# Patient Record
Sex: MALE | Race: WHITE | Employment: FULL TIME | URBAN - METROPOLITAN AREA
[De-identification: names, ages, dates, MRNs, and addresses within clinical notes are randomized per-mention and may not be internally consistent; named-entity substitution may affect disease eponyms.]

---

## 2019-11-21 RX ORDER — IBUPROFEN 200 MG
400 TABLET ORAL
COMMUNITY

## 2019-11-21 NOTE — PERIOP NOTES
PAT PREOP PHONE INTERVIEW COMPLETED WITH:     PATIENT ADVISED NOT TO EAT/DRINK ANYTHING PAST MIDNIGHT EVENING PRIOR TO SURGERY,  NOTHING TO EAT/DRINK MORNING OF SURGERY UNLESS SIP OF WATER TO SWALLOW MEDICATION;  LEAVE ALL VALUABLES AT HOME; DO BRING PICTURE ID, INSURANCE CARD AND ANY COPAY; WEAR COMFORTABLE CLOTHING;  NO PERFUMES, POWDERS, LOTIONS; NO ALCOHOL 24 HOURS BEFORE OR AFTER SURGERY;  WILL NEED TO BE DRIVEN HOME BY FAMILY OR FRIEND;  AVOID TAKING NSAIDS, ASPIRIN, FISH OIL, VITAMIN E OR GLUCOSAMINE/CHONDROITIN DURING THIS TIME PRIOR TO SURGERY;  MAY TAKE TYLENOL. INSTRUCTED TO REPORT  [address] BY SURGEON'S OFFICE.

## 2019-11-23 ENCOUNTER — ANESTHESIA EVENT (OUTPATIENT)
Dept: SURGERY | Age: 49
End: 2019-11-23
Payer: COMMERCIAL

## 2019-11-25 ENCOUNTER — HOSPITAL ENCOUNTER (OUTPATIENT)
Age: 49
Setting detail: OUTPATIENT SURGERY
Discharge: HOME | End: 2019-11-25
Attending: DENTIST | Admitting: DENTIST
Payer: COMMERCIAL

## 2019-11-25 ENCOUNTER — ANESTHESIA (OUTPATIENT)
Dept: SURGERY | Age: 49
End: 2019-11-25
Payer: COMMERCIAL

## 2019-11-25 VITALS
TEMPERATURE: 98 F | HEIGHT: 73 IN | WEIGHT: 195 LBS | HEART RATE: 87 BPM | SYSTOLIC BLOOD PRESSURE: 132 MMHG | OXYGEN SATURATION: 93 % | RESPIRATION RATE: 20 BRPM | DIASTOLIC BLOOD PRESSURE: 88 MMHG | BODY MASS INDEX: 25.84 KG/M2

## 2019-11-25 DIAGNOSIS — M27.40 MAXILLARY CYST: Primary | ICD-10-CM

## 2019-11-25 PROCEDURE — 88305 TISSUE EXAM BY PATHOLOGIST: CPT

## 2019-11-25 PROCEDURE — 74011000250 HC RX REV CODE- 250: Performed by: NURSE ANESTHETIST, CERTIFIED REGISTERED

## 2019-11-25 PROCEDURE — 74011250637 HC RX REV CODE- 250/637: Performed by: NURSE ANESTHETIST, CERTIFIED REGISTERED

## 2019-11-25 PROCEDURE — 74011000258 HC RX REV CODE- 258: Performed by: NURSE ANESTHETIST, CERTIFIED REGISTERED

## 2019-11-25 PROCEDURE — 74011250636 HC RX REV CODE- 250/636: Performed by: ANESTHESIOLOGY

## 2019-11-25 PROCEDURE — 77030008683 HC TU ET CUF COVD -A: Performed by: ANESTHESIOLOGY

## 2019-11-25 PROCEDURE — 74011250637 HC RX REV CODE- 250/637: Performed by: ANESTHESIOLOGY

## 2019-11-25 PROCEDURE — 76210000020 HC REC RM PH II FIRST 0.5 HR: Performed by: DENTIST

## 2019-11-25 PROCEDURE — 77030004386 HC BUR FISS STRY -B: Performed by: DENTIST

## 2019-11-25 PROCEDURE — 77030018836 HC SOL IRR NACL ICUM -A: Performed by: DENTIST

## 2019-11-25 PROCEDURE — 76060000035 HC ANESTHESIA 2 TO 2.5 HR: Performed by: DENTIST

## 2019-11-25 PROCEDURE — 76210000006 HC OR PH I REC 0.5 TO 1 HR: Performed by: DENTIST

## 2019-11-25 PROCEDURE — 74011250636 HC RX REV CODE- 250/636: Performed by: NURSE ANESTHETIST, CERTIFIED REGISTERED

## 2019-11-25 PROCEDURE — 77030008684 HC TU ET CUF COVD -B: Performed by: ANESTHESIOLOGY

## 2019-11-25 PROCEDURE — 77030018846 HC SOL IRR STRL H20 ICUM -A: Performed by: DENTIST

## 2019-11-25 PROCEDURE — 76010000131 HC OR TIME 2 TO 2.5 HR: Performed by: DENTIST

## 2019-11-25 PROCEDURE — 74011000250 HC RX REV CODE- 250: Performed by: DENTIST

## 2019-11-25 PROCEDURE — 77030040922 HC BLNKT HYPOTHRM STRY -A

## 2019-11-25 PROCEDURE — 77030011283 HC ELECTRD NDL COVD -A: Performed by: DENTIST

## 2019-11-25 PROCEDURE — 77030002888 HC SUT CHRMC J&J -A: Performed by: DENTIST

## 2019-11-25 RX ORDER — HYDROCODONE BITARTRATE AND ACETAMINOPHEN 5; 325 MG/1; MG/1
1 TABLET ORAL AS NEEDED
Status: DISCONTINUED | OUTPATIENT
Start: 2019-11-25 | End: 2019-11-25 | Stop reason: HOSPADM

## 2019-11-25 RX ORDER — MORPHINE SULFATE 10 MG/ML
2 INJECTION, SOLUTION INTRAMUSCULAR; INTRAVENOUS
Status: DISCONTINUED | OUTPATIENT
Start: 2019-11-25 | End: 2019-11-25 | Stop reason: HOSPADM

## 2019-11-25 RX ORDER — GLYCOPYRROLATE 0.2 MG/ML
0.2 INJECTION INTRAMUSCULAR; INTRAVENOUS
Status: DISCONTINUED | OUTPATIENT
Start: 2019-11-25 | End: 2019-11-25 | Stop reason: HOSPADM

## 2019-11-25 RX ORDER — FENTANYL CITRATE 50 UG/ML
INJECTION, SOLUTION INTRAMUSCULAR; INTRAVENOUS AS NEEDED
Status: DISCONTINUED | OUTPATIENT
Start: 2019-11-25 | End: 2019-11-25 | Stop reason: HOSPADM

## 2019-11-25 RX ORDER — HYDROMORPHONE HYDROCHLORIDE 1 MG/ML
0.2 INJECTION, SOLUTION INTRAMUSCULAR; INTRAVENOUS; SUBCUTANEOUS
Status: DISCONTINUED | OUTPATIENT
Start: 2019-11-25 | End: 2019-11-25 | Stop reason: HOSPADM

## 2019-11-25 RX ORDER — ROCURONIUM BROMIDE 10 MG/ML
INJECTION, SOLUTION INTRAVENOUS AS NEEDED
Status: DISCONTINUED | OUTPATIENT
Start: 2019-11-25 | End: 2019-11-25 | Stop reason: HOSPADM

## 2019-11-25 RX ORDER — ROPIVACAINE HYDROCHLORIDE 5 MG/ML
30 INJECTION, SOLUTION EPIDURAL; INFILTRATION; PERINEURAL AS NEEDED
Status: DISCONTINUED | OUTPATIENT
Start: 2019-11-25 | End: 2019-11-25 | Stop reason: HOSPADM

## 2019-11-25 RX ORDER — MIDAZOLAM HYDROCHLORIDE 1 MG/ML
0.5 INJECTION, SOLUTION INTRAMUSCULAR; INTRAVENOUS
Status: DISCONTINUED | OUTPATIENT
Start: 2019-11-25 | End: 2019-11-25 | Stop reason: HOSPADM

## 2019-11-25 RX ORDER — DIPHENHYDRAMINE HYDROCHLORIDE 50 MG/ML
12.5 INJECTION, SOLUTION INTRAMUSCULAR; INTRAVENOUS AS NEEDED
Status: DISCONTINUED | OUTPATIENT
Start: 2019-11-25 | End: 2019-11-25 | Stop reason: HOSPADM

## 2019-11-25 RX ORDER — GLYCOPYRROLATE 0.2 MG/ML
INJECTION INTRAMUSCULAR; INTRAVENOUS AS NEEDED
Status: DISCONTINUED | OUTPATIENT
Start: 2019-11-25 | End: 2019-11-25 | Stop reason: HOSPADM

## 2019-11-25 RX ORDER — SODIUM CHLORIDE, SODIUM LACTATE, POTASSIUM CHLORIDE, CALCIUM CHLORIDE 600; 310; 30; 20 MG/100ML; MG/100ML; MG/100ML; MG/100ML
1000 INJECTION, SOLUTION INTRAVENOUS CONTINUOUS
Status: DISCONTINUED | OUTPATIENT
Start: 2019-11-25 | End: 2019-11-25 | Stop reason: HOSPADM

## 2019-11-25 RX ORDER — SODIUM CHLORIDE, SODIUM LACTATE, POTASSIUM CHLORIDE, CALCIUM CHLORIDE 600; 310; 30; 20 MG/100ML; MG/100ML; MG/100ML; MG/100ML
INJECTION, SOLUTION INTRAVENOUS
Status: DISCONTINUED | OUTPATIENT
Start: 2019-11-25 | End: 2019-11-25 | Stop reason: HOSPADM

## 2019-11-25 RX ORDER — PROPOFOL 10 MG/ML
INJECTION, EMULSION INTRAVENOUS AS NEEDED
Status: DISCONTINUED | OUTPATIENT
Start: 2019-11-25 | End: 2019-11-25 | Stop reason: HOSPADM

## 2019-11-25 RX ORDER — MIDAZOLAM HYDROCHLORIDE 1 MG/ML
INJECTION, SOLUTION INTRAMUSCULAR; INTRAVENOUS AS NEEDED
Status: DISCONTINUED | OUTPATIENT
Start: 2019-11-25 | End: 2019-11-25 | Stop reason: HOSPADM

## 2019-11-25 RX ORDER — DEXAMETHASONE SODIUM PHOSPHATE 4 MG/ML
INJECTION, SOLUTION INTRA-ARTICULAR; INTRALESIONAL; INTRAMUSCULAR; INTRAVENOUS; SOFT TISSUE AS NEEDED
Status: DISCONTINUED | OUTPATIENT
Start: 2019-11-25 | End: 2019-11-25 | Stop reason: HOSPADM

## 2019-11-25 RX ORDER — MIDAZOLAM HYDROCHLORIDE 1 MG/ML
1 INJECTION, SOLUTION INTRAMUSCULAR; INTRAVENOUS AS NEEDED
Status: DISCONTINUED | OUTPATIENT
Start: 2019-11-25 | End: 2019-11-25 | Stop reason: HOSPADM

## 2019-11-25 RX ORDER — SUCCINYLCHOLINE CHLORIDE 20 MG/ML
INJECTION INTRAMUSCULAR; INTRAVENOUS AS NEEDED
Status: DISCONTINUED | OUTPATIENT
Start: 2019-11-25 | End: 2019-11-25 | Stop reason: HOSPADM

## 2019-11-25 RX ORDER — LIDOCAINE HYDROCHLORIDE AND EPINEPHRINE 20; 10 MG/ML; UG/ML
INJECTION, SOLUTION INFILTRATION; PERINEURAL AS NEEDED
Status: DISCONTINUED | OUTPATIENT
Start: 2019-11-25 | End: 2019-11-25 | Stop reason: HOSPADM

## 2019-11-25 RX ORDER — ONDANSETRON 2 MG/ML
4 INJECTION INTRAMUSCULAR; INTRAVENOUS AS NEEDED
Status: DISCONTINUED | OUTPATIENT
Start: 2019-11-25 | End: 2019-11-25 | Stop reason: HOSPADM

## 2019-11-25 RX ORDER — CEFAZOLIN SODIUM 1 G/3ML
INJECTION, POWDER, FOR SOLUTION INTRAMUSCULAR; INTRAVENOUS AS NEEDED
Status: DISCONTINUED | OUTPATIENT
Start: 2019-11-25 | End: 2019-11-25 | Stop reason: HOSPADM

## 2019-11-25 RX ORDER — LIDOCAINE HYDROCHLORIDE AND EPINEPHRINE 10; 10 MG/ML; UG/ML
INJECTION, SOLUTION INFILTRATION; PERINEURAL AS NEEDED
Status: DISCONTINUED | OUTPATIENT
Start: 2019-11-25 | End: 2019-11-25 | Stop reason: HOSPADM

## 2019-11-25 RX ORDER — FENTANYL CITRATE 50 UG/ML
25 INJECTION, SOLUTION INTRAMUSCULAR; INTRAVENOUS
Status: DISCONTINUED | OUTPATIENT
Start: 2019-11-25 | End: 2019-11-25 | Stop reason: HOSPADM

## 2019-11-25 RX ORDER — OXYMETAZOLINE HCL 0.05 %
SPRAY, NON-AEROSOL (ML) NASAL AS NEEDED
Status: DISCONTINUED | OUTPATIENT
Start: 2019-11-25 | End: 2019-11-25 | Stop reason: HOSPADM

## 2019-11-25 RX ORDER — SODIUM CHLORIDE 9 MG/ML
25 INJECTION, SOLUTION INTRAVENOUS CONTINUOUS
Status: DISCONTINUED | OUTPATIENT
Start: 2019-11-25 | End: 2019-11-25 | Stop reason: HOSPADM

## 2019-11-25 RX ORDER — LIDOCAINE HYDROCHLORIDE 10 MG/ML
0.1 INJECTION, SOLUTION EPIDURAL; INFILTRATION; INTRACAUDAL; PERINEURAL AS NEEDED
Status: DISCONTINUED | OUTPATIENT
Start: 2019-11-25 | End: 2019-11-25 | Stop reason: HOSPADM

## 2019-11-25 RX ORDER — SCOLOPAMINE TRANSDERMAL SYSTEM 1 MG/1
PATCH, EXTENDED RELEASE TRANSDERMAL AS NEEDED
Status: DISCONTINUED | OUTPATIENT
Start: 2019-11-25 | End: 2019-11-25 | Stop reason: HOSPADM

## 2019-11-25 RX ORDER — ONDANSETRON 2 MG/ML
INJECTION INTRAMUSCULAR; INTRAVENOUS AS NEEDED
Status: DISCONTINUED | OUTPATIENT
Start: 2019-11-25 | End: 2019-11-25 | Stop reason: HOSPADM

## 2019-11-25 RX ORDER — HYDROCODONE BITARTRATE AND ACETAMINOPHEN 5; 325 MG/1; MG/1
1 TABLET ORAL
Qty: 15 TAB | Refills: 0 | Status: SHIPPED | OUTPATIENT
Start: 2019-11-25 | End: 2019-11-30

## 2019-11-25 RX ORDER — FENTANYL CITRATE 50 UG/ML
50 INJECTION, SOLUTION INTRAMUSCULAR; INTRAVENOUS AS NEEDED
Status: DISCONTINUED | OUTPATIENT
Start: 2019-11-25 | End: 2019-11-25 | Stop reason: HOSPADM

## 2019-11-25 RX ORDER — ACETAMINOPHEN 325 MG/1
650 TABLET ORAL ONCE
Status: COMPLETED | OUTPATIENT
Start: 2019-11-25 | End: 2019-11-25

## 2019-11-25 RX ORDER — ALBUTEROL SULFATE 0.83 MG/ML
2.5 SOLUTION RESPIRATORY (INHALATION) AS NEEDED
Status: DISCONTINUED | OUTPATIENT
Start: 2019-11-25 | End: 2019-11-25 | Stop reason: HOSPADM

## 2019-11-25 RX ADMIN — OXYMETAZOLINE HYDROCHLORIDE 3 SPRAY: 5 SPRAY NASAL at 07:40

## 2019-11-25 RX ADMIN — DEXAMETHASONE SODIUM PHOSPHATE 10 MG: 4 INJECTION, SOLUTION INTRAMUSCULAR; INTRAVENOUS at 07:58

## 2019-11-25 RX ADMIN — ROCURONIUM BROMIDE 5 MG: 10 SOLUTION INTRAVENOUS at 07:44

## 2019-11-25 RX ADMIN — SUCCINYLCHOLINE CHLORIDE 140 MG: 20 INJECTION, SOLUTION INTRAMUSCULAR; INTRAVENOUS at 07:44

## 2019-11-25 RX ADMIN — OXYMETAZOLINE HYDROCHLORIDE 3 SPRAY: 5 SPRAY NASAL at 07:34

## 2019-11-25 RX ADMIN — ACETAMINOPHEN 650 MG: 325 TABLET ORAL at 06:45

## 2019-11-25 RX ADMIN — PROPOFOL 50 MG: 10 INJECTION, EMULSION INTRAVENOUS at 09:04

## 2019-11-25 RX ADMIN — FENTANYL CITRATE 100 MCG: 50 INJECTION, SOLUTION INTRAMUSCULAR; INTRAVENOUS at 07:44

## 2019-11-25 RX ADMIN — SODIUM CHLORIDE, SODIUM LACTATE, POTASSIUM CHLORIDE, AND CALCIUM CHLORIDE 1000 ML: 600; 310; 30; 20 INJECTION, SOLUTION INTRAVENOUS at 06:38

## 2019-11-25 RX ADMIN — MIDAZOLAM 1 MG: 1 INJECTION INTRAMUSCULAR; INTRAVENOUS at 07:40

## 2019-11-25 RX ADMIN — PHENYLEPHRINE HYDROCHLORIDE 30 MCG/MIN: 10 INJECTION INTRAVENOUS at 08:11

## 2019-11-25 RX ADMIN — SODIUM CHLORIDE, POTASSIUM CHLORIDE, SODIUM LACTATE AND CALCIUM CHLORIDE: 600; 310; 30; 20 INJECTION, SOLUTION INTRAVENOUS at 07:34

## 2019-11-25 RX ADMIN — DEXMEDETOMIDINE HYDROCHLORIDE 4 MCG: 100 INJECTION, SOLUTION, CONCENTRATE INTRAVENOUS at 07:40

## 2019-11-25 RX ADMIN — GLYCOPYRROLATE 0.4 MG: 0.2 INJECTION, SOLUTION INTRAMUSCULAR; INTRAVENOUS at 09:04

## 2019-11-25 RX ADMIN — SUCCINYLCHOLINE CHLORIDE 60 MG: 20 INJECTION, SOLUTION INTRAMUSCULAR; INTRAVENOUS at 07:48

## 2019-11-25 RX ADMIN — PROPOFOL 150 MG: 10 INJECTION, EMULSION INTRAVENOUS at 07:44

## 2019-11-25 RX ADMIN — DEXMEDETOMIDINE HYDROCHLORIDE 8 MCG: 100 INJECTION, SOLUTION, CONCENTRATE INTRAVENOUS at 07:34

## 2019-11-25 RX ADMIN — ONDANSETRON HYDROCHLORIDE 4 MG: 2 INJECTION, SOLUTION INTRAMUSCULAR; INTRAVENOUS at 09:21

## 2019-11-25 RX ADMIN — FENTANYL CITRATE 25 MCG: 50 INJECTION, SOLUTION INTRAMUSCULAR; INTRAVENOUS at 09:34

## 2019-11-25 RX ADMIN — SCOPALAMINE 1 PATCH: 1 PATCH, EXTENDED RELEASE TRANSDERMAL at 07:34

## 2019-11-25 RX ADMIN — ONDANSETRON HYDROCHLORIDE 4 MG: 2 INJECTION, SOLUTION INTRAMUSCULAR; INTRAVENOUS at 07:58

## 2019-11-25 RX ADMIN — CEFAZOLIN 2 G: 330 INJECTION, POWDER, FOR SOLUTION INTRAMUSCULAR; INTRAVENOUS at 07:58

## 2019-11-25 RX ADMIN — MIDAZOLAM 2 MG: 1 INJECTION INTRAMUSCULAR; INTRAVENOUS at 07:34

## 2019-11-25 NOTE — BRIEF OP NOTE
BRIEF OPERATIVE NOTE    Date of Procedure: 11/25/2019   Preoperative Diagnosis: BENIGN CYST/TUMOR OF BONES OF FACE(MAXILLA), CHRONIC MAXILALRY SINUSITIS, CHRONIC PERIAPICAL ABSCESS WITHOUT SINUS  Postoperative Diagnosis: BENIGN CYST/TUMOR OF BONES OF FACE, CHRONIC MAXILALRY SINUSITIS, CHRONIC PERIAPICAL ABSCESS WITHOUT SINUS    Procedure(s):  EXCISION CYST OF MAXILLA, APICOECTOMIES BUCCAL ROOTS  (x4) #14, #15  AND RETROGRADE FILLINGS X4  Surgeon(s) and Role:     Anya Angel DDS - Primary         Surgical Assistant: None    Surgical Staff:  Circ-1: Rigo Domingo RN  Circ-Relief: Imtiaz Jose  Scrub Tech-1: [facility]  Surg Asst-1: Ashlie Gaitan  Event Time In Time Out   Incision Start 5169    Incision Close 0927      Anesthesia: General   Estimated Blood Loss: 10cc  Specimens:   ID Type Source Tests Collected by Time Destination   1 : CYST LEFT MAXILLA Fresh Cyst  Ad Flores DDS 11/25/2019 0821 Pathology      Findings: No extension of cyst into max.  sinus airspace   Complications: None  Implants: * No implants in log *

## 2019-11-25 NOTE — OP NOTES
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 PROCEDURES PERFORMED:  Excision of cyst, left maxilla, apicoectomies buccal roots (x4), teeth numbers 14 and 15, and retrograde fillings x4. SURGEON:  Rolo Wang DDS    ASSISTANT:  None. ANESTHESIA:  General.    COMPLICATIONS:  None. SPECIMENS REMOVED:  Specimens x1, cyst, left maxilla. IMPLANTS:  None. ESTIMATED BLOOD LOSS:  10 mL. INDICATIONS FOR SURGERY:  The patient is a 29-year-old white male, who has had persistent pain and some swelling associated with the left maxilla and infraorbital area. He had undergone root canal therapy of teeth numbers 14 and 15 in the past and was found to have a cystic-appearing lesion from the anterior of the #14 root posteriorly to the posterior portion of the maxilla and superiorly to the sinus floor. There was also a radiopaque character and substance present in this area as well of unknown etiology, but most likely secondary to bone graft done previously many years ago at the time of his wisdom tooth surgery. He presents for removal of the lesion and apicoectomies as outlined above. PROCEDURE IN DETAIL:  The patient was taken to the operating room and placed in the supine position. After induction of anesthesia and oral intubation, he was prepped and draped in routine fashion for intraoral surgery. A throat pack was placed and a total of 9 mL of 2% Xylocaine with 1:100,000 epinephrine was given to anesthetize the left posterior maxilla.   An incision was made across the left maxillary tuberosity and forward  in the gingival sulcus to the mesial of tooth #14 and then vertically angled anteriorly into the vestibule. The full-thickness subperiosteal flap was developed and reflected superiorly and it could easily immediately be seen that the radio-opaque substance was indeed what appeared to be an old grasp perhaps of hydroxyapatite. Access was gained through the bone of a lateral maxilla just above root apices of teeth #14 and #15 until the cyst could be identified, and then the bony ostectomy was enlarged in order gain access to this area. Once this was accomplished and a portion of the previous replaced bone graft was removed in order to gain access, the cyst was curetted out and one large specimen was sent to pathology. The four lateral roots were then cut back so that the root canal fillings could be seen at each one and additional remnants of any cystic material was removed. Each of the four buccal roots of teeth numbers 14 and 15 underwent ultrasonic endodontic preparation of the apex and these four areas were retro-filled with Super and then EBA cement. The excess cement was then removed once the cement had set, and a thorough irrigation of the cystic cavity was accomplished. The bone on the periphery was then rounded off and smoothed with the rotary geetha hand instruments and the area was once again thoroughly irrigated and then closed with 3-0 chromic and 4-0 chromic sutures. The palatal roots were not involved in the lesion. There was also no overt opening into the sinus air space. The throat pack was removed and the patient was then taken to recovery room in stable condition.         Jana Hinkle DDS MM/[email or web address]_I/[email or web address]_P  D:  11/25/2019 10:08  T:  11/25/2019 14:17  JOB #:  [id number]

## 2019-11-25 NOTE — PERIOP NOTES
Patient: Bree Goldstein MRN: [id number]  SSN: [id number]   YOB: 1970  Age: 54 y.o. Sex: male     Patient is status post Procedure(s):  EXCISION CYST OF MAXILLA, APICOECTOMIES BUCCAL ROOTS  (x4) #14, #15  AND RETROGRADE FILLINGS X4.    Surgeon(s) and Role:     Huma Meza DDS - Primary    Local/Dose/Irrigation:  9mL 2% marcaine with 1:[id number] epi local injection to left mouth, 1L normal saline irrigation prn surgical site                   Peripheral IV 11/25/19 Right Hand (Active)                           Dressing/Packing:  Wound Mouth Left-Dressing Type: 4 x 4;Dry dressing; Sutures(oral) (11/25/19 3778)    Splint/Cast:  ]    Other:  scd

## 2019-11-25 NOTE — DISCHARGE INSTRUCTIONS
PATIENT DISCHARGE INSTRUCTIONS      PATIENT DISCHARGE INSTRUCTIONS    Godfrey Zuleta / [id number] : 1970    Admitted 2019 Discharged: 2019       · It is important that you take the medication exactly as they are prescribed. · Keep your medication in the bottles provided by the pharmacist and keep a list of the medication names, dosages, and times to be taken in your wallet. · Do not take other medications without consulting your doctor. What to do at Home    Recommended Diet: Regular Diet - soft and chew on right    Recommended Activity: Activity as tolerated - mild    Ice to left face - 20 min on/off - for 36 hours or so after surgery. Keep head elevated while lying down for at least 3 days after surgery    If you experience any of the following symptoms - unusual bleeding, nausea or pain, please follow up with Dr. Hari Gaspar at [phone]. Patient Education   Scopolamine (Absorbed through the skin)   Scopolamine ([id number])  Treat nausea and vomiting. Brand Name(s): Transderm Scop   There may be other brand names for this medicine. When This Medicine Should Not Be Used: You should not use this medicine if you have had an allergic reaction to scopolamine, or if you have narrow angle glaucoma. How to Use This Medicine:   Patch  · Your doctor will tell you how many patches to use, where to apply them, and how often to apply them. Do not use more patches or apply them more often than your doctor tells you to. · Read and follow the patient instructions that come with this medicine. Talk to your doctor or pharmacist if you have any questions. · To prevent motion sickness, apply the patch at least 4 hours before you need it. · Wash and dry your hands thoroughly before applying the patch. · Leave the patch in its sealed wrapper until you are ready to put it on. Tear the wrapper open carefully. NEVER CUT the wrapper or the patch with scissors.  Do not use any patch that has been cut by accident. · Take the liner off the sticky side before applying. · Apply the patch to dry, hairless skin behind the ear. · If the patch is loose or falls off, apply a new patch at a different place behind the ear. · After you take off the patch, wash the place where the patch was and your hands thoroughly. · Only one patch should be used at any time. If a dose is missed:   · If you forget to wear or change a patch, put one on as soon as you can. If it is almost time to put on your next patch, wait until then to apply a new patch and skip the one you missed. Do not apply extra patches to make up for a missed dose. How to Store and Dispose of This Medicine:   · Store the patches at room temperature in a closed container, away from heat, moisture, and direct light. · Fold the used patch in half with the sticky sides together. Throw any used patch away so that children or pets cannot get to it. You will also need to throw away old patches after the expiration date has passed. · Keep all medicine out of the reach of children. Never share your medicine with anyone. Drugs and Foods to Avoid:   Ask your doctor or pharmacist before using any other medicine, including over-the-counter medicines, vitamins, and herbal products. · Tell your doctor if you use anything else that makes you sleepy. Some examples are allergy medicine, narcotic pain medicine, and alcohol. · Do not drink alcohol while you are using this medicine. Warnings While Using This Medicine:   · Make sure your doctor knows if you are pregnant or breastfeeding, or if you have glaucoma, prostate problems, trouble urinating, blocked bowels, liver disease, kidney disease, or a history of seizures or mental illness. · This medicine can cause blurring of vision and other vision problems if it comes in contact with the eyes. This medicine may also cause problems with urination.  If any of these reactions occur, remove the patch and call your doctor right away. · This medicine may make you dizzy or drowsy. Avoid driving, using machines, or doing anything else that could be dangerous if you are not alert. If you plan to participate in underwater sports, this medicine may cause disorienting effects. If this is a concern for you, talk with your doctor. · This medicine may make you sweat less and cause your body to get too hot. Be careful in hot weather, when you are exercising, or if using a sauna or whirlpool. · Tell any doctor or dentist who treats you that you are using this medicine. This medicine may affect certain medical test results. · Skin burns have been reported at the patch site in several patients wearing an aluminized transdermal system during a magnetic resonance imaging scan (MRI). Because Transderm Sc?p® contains aluminum, it is recommended to remove the system before undergoing an MRI. Possible Side Effects While Using This Medicine:   Call your doctor right away if you notice any of these side effects:  · Allergic reaction: Itching or hives, swelling in your face or hands, swelling or tingling in your mouth or throat, chest tightness, trouble breathing  · Blurred vision. · Confusion or memory loss. · Fast, slow, or uneven heartbeat. · Lightheadedness, dizziness, drowsiness, or fainting. · Seeing, hearing, or feeling things that are not there. · Severe eye pain. · Trouble urinating. If you notice these less serious side effects, talk with your doctor:   · Dry mouth. · Dry, itchy, or red eyes. · Restlessness. · Skin rash or redness. If you notice other side effects that you think are caused by this medicine, tell your doctor. Call your doctor for medical advice about side effects. You may report side effects to FDA at 2-236-FDA-0534  © 2017 [facility] [place] Information is for End User's use only and may not be sold, redistributed or otherwise used for commercial purposes.   The above information is an educational aid only. It is not intended as medical advice for individual conditions or treatments. Talk to your doctor, nurse or pharmacist before following any medical regimen to see if it is safe and effective for you.                      ______________________________________________________________________    Anesthesia Discharge Instructions    After general anesthesia or intervenous sedation, for 24 hours or while taking prescription Narcotics:  · Limit your activities  · Do not drive or operate hazardous machinery  · If you have not urinated within 8 hours after discharge, please contact your surgeon on call. · Do not make important personal or business decisions  · Do not drink alcoholic beverages    Report the following to your surgeon:  · Excessive pain, swelling, redness or odor of or around the surgical area  · Temperature over 100.5 degrees  · Nausea and vomiting lasting longer than 4 hours or if unable to take medication  · Any signs of decreased circulation or nerve impairment to extremity:  Change in color, persistent numbness, tingling, coldness or increased pain.   · Any questions

## 2019-11-25 NOTE — ANESTHESIA POSTPROCEDURE EVALUATION
Post-Anesthesia Evaluation and Assessment    Patient: Edilson Cobb MRN: [id number]  SSN: [id number]    YOB: 1970  Age: 54 y.o. Sex: male      I have evaluated the patient and they are stable and ready for discharge from the PACU. Cardiovascular Function/Vital Signs  Visit Vitals  /88   Pulse 81   Temp 36.8 °C (98.3 °F)   Resp 14   Ht 6' 1\" (1.854 m)   Wt 88.5 kg (195 lb)   SpO2 94%   BMI 25.73 kg/m²       Patient is status post General anesthesia for Procedure(s):  EXCISION CYST OF MAXILLA, APICOECTOMIES BUCCAL ROOTS  (x4) #14, #15  AND RETROGRADE FILLINGS X4.    Nausea/Vomiting: None    Postoperative hydration reviewed and adequate. Pain:  Pain Scale 1: Numeric (0 - 10) (11/25/19 0622)  Pain Intensity 1: 0 (11/25/19 0622)   Managed    Neurological Status:   Neuro (WDL): Within Defined Limits (11/25/19 1368)   At baseline    Mental Status, Level of Consciousness: Alert and  oriented to person, place, and time    Pulmonary Status:   O2 Device: Nasal cannula (11/25/19 0946)   Adequate oxygenation and airway patent    Complications related to anesthesia: None    Post-anesthesia assessment completed. No concerns    Signed By: Kaylin Jaquez MD     November 25, 2019              Procedure(s):  EXCISION CYST OF MAXILLA, APICOECTOMIES BUCCAL ROOTS  (x4) #14, #15  AND RETROGRADE FILLINGS X4.    general    <BSHSIANPOST>    Vitals Value Taken Time   /88 11/25/2019  9:46 AM   Temp 36.8 °C (98.3 °F) 11/25/2019  9:46 AM   Pulse 79 11/25/2019  9:49 AM   Resp 15 11/25/2019  9:49 AM   SpO2 97 % 11/25/2019  9:49 AM   Vitals shown include unvalidated device data.

## 2021-08-28 ENCOUNTER — HOSPITAL ENCOUNTER (EMERGENCY)
Age: 51
Discharge: HOME | End: 2021-08-29
Attending: EMERGENCY MEDICINE
Payer: COMMERCIAL

## 2021-08-28 DIAGNOSIS — N20.1 RIGHT URETERAL STONE: Primary | ICD-10-CM

## 2021-08-28 LAB
COMMENT, HOLDF: NORMAL
SAMPLES BEING HELD,HOLD: NORMAL

## 2021-08-28 PROCEDURE — 96375 TX/PRO/DX INJ NEW DRUG ADDON: CPT

## 2021-08-28 PROCEDURE — 96374 THER/PROPH/DIAG INJ IV PUSH: CPT

## 2021-08-28 PROCEDURE — 74011250636 HC RX REV CODE- 250/636: Performed by: EMERGENCY MEDICINE

## 2021-08-28 PROCEDURE — 80048 BASIC METABOLIC PNL TOTAL CA: CPT

## 2021-08-28 PROCEDURE — 85025 COMPLETE CBC W/AUTO DIFF WBC: CPT

## 2021-08-28 PROCEDURE — 99284 EMERGENCY DEPT VISIT MOD MDM: CPT

## 2021-08-28 PROCEDURE — 36415 COLL VENOUS BLD VENIPUNCTURE: CPT

## 2021-08-28 RX ORDER — SODIUM CHLORIDE 0.9 % (FLUSH) 0.9 %
5-40 SYRINGE (ML) INJECTION EVERY 8 HOURS
Status: DISCONTINUED | OUTPATIENT
Start: 2021-08-29 | End: 2021-08-29 | Stop reason: HOSPADM

## 2021-08-28 RX ORDER — KETOROLAC TROMETHAMINE 30 MG/ML
30 INJECTION, SOLUTION INTRAMUSCULAR; INTRAVENOUS
Status: COMPLETED | OUTPATIENT
Start: 2021-08-29 | End: 2021-08-28

## 2021-08-28 RX ORDER — ONDANSETRON 2 MG/ML
4 INJECTION INTRAMUSCULAR; INTRAVENOUS
Status: COMPLETED | OUTPATIENT
Start: 2021-08-29 | End: 2021-08-28

## 2021-08-28 RX ORDER — SODIUM CHLORIDE 0.9 % (FLUSH) 0.9 %
5-40 SYRINGE (ML) INJECTION AS NEEDED
Status: DISCONTINUED | OUTPATIENT
Start: 2021-08-28 | End: 2021-08-29 | Stop reason: HOSPADM

## 2021-08-28 RX ADMIN — ONDANSETRON 4 MG: 2 INJECTION INTRAMUSCULAR; INTRAVENOUS at 23:56

## 2021-08-28 RX ADMIN — SODIUM CHLORIDE 1000 ML: 9 INJECTION, SOLUTION INTRAVENOUS at 23:56

## 2021-08-28 RX ADMIN — KETOROLAC TROMETHAMINE 30 MG: 30 INJECTION, SOLUTION INTRAMUSCULAR; INTRAVENOUS at 23:57

## 2021-08-29 ENCOUNTER — APPOINTMENT (OUTPATIENT)
Dept: CT IMAGING | Age: 51
End: 2021-08-29
Attending: EMERGENCY MEDICINE
Payer: COMMERCIAL

## 2021-08-29 VITALS
BODY MASS INDEX: 27.32 KG/M2 | DIASTOLIC BLOOD PRESSURE: 99 MMHG | HEIGHT: 73 IN | WEIGHT: 206.13 LBS | SYSTOLIC BLOOD PRESSURE: 146 MMHG | OXYGEN SATURATION: 96 % | HEART RATE: 64 BPM | RESPIRATION RATE: 14 BRPM | TEMPERATURE: 97.9 F

## 2021-08-29 LAB
AMORPH CRY URNS QL MICRO: ABNORMAL
APPEARANCE UR: ABNORMAL
BACTERIA URNS QL MICRO: ABNORMAL /HPF
BILIRUB UR QL: NEGATIVE
COLOR UR: ABNORMAL
EPITH CASTS URNS QL MICRO: ABNORMAL /LPF
GLUCOSE UR STRIP.AUTO-MCNC: NEGATIVE MG/DL
HGB UR QL STRIP: ABNORMAL
KETONES UR QL STRIP.AUTO: ABNORMAL MG/DL
LEUKOCYTE ESTERASE UR QL STRIP.AUTO: NEGATIVE
NITRITE UR QL STRIP.AUTO: NEGATIVE
PH UR STRIP: 5.5 [PH] (ref 5–8)
PROT UR STRIP-MCNC: 100 MG/DL
RBC #/AREA URNS HPF: >100 /HPF (ref 0–5)
SP GR UR REFRACTOMETRY: >1.03 (ref 1–1.03)
UR CULT HOLD, URHOLD: NORMAL
UROBILINOGEN UR QL STRIP.AUTO: 0.2 EU/DL (ref 0.2–1)
WBC URNS QL MICRO: ABNORMAL /HPF (ref 0–4)

## 2021-08-29 PROCEDURE — 81001 URINALYSIS AUTO W/SCOPE: CPT

## 2021-08-29 PROCEDURE — 74011250636 HC RX REV CODE- 250/636: Performed by: EMERGENCY MEDICINE

## 2021-08-29 PROCEDURE — 74176 CT ABD & PELVIS W/O CONTRAST: CPT

## 2021-08-29 RX ORDER — HYDROMORPHONE HYDROCHLORIDE 1 MG/ML
1 INJECTION, SOLUTION INTRAMUSCULAR; INTRAVENOUS; SUBCUTANEOUS ONCE
Status: COMPLETED | OUTPATIENT
Start: 2021-08-29 | End: 2021-08-29

## 2021-08-29 RX ORDER — TAMSULOSIN HYDROCHLORIDE 0.4 MG/1
0.4 CAPSULE ORAL DAILY
Qty: 7 CAPSULE | Refills: 0 | Status: SHIPPED | OUTPATIENT
Start: 2021-08-29 | End: 2021-09-05

## 2021-08-29 RX ORDER — ONDANSETRON 4 MG/1
4 TABLET, ORALLY DISINTEGRATING ORAL
Qty: 12 TABLET | Refills: 0 | OUTPATIENT
Start: 2021-08-29 | End: 2021-10-03

## 2021-08-29 RX ORDER — OXYCODONE AND ACETAMINOPHEN 5; 325 MG/1; MG/1
1 TABLET ORAL
Qty: 12 TABLET | Refills: 0 | Status: SHIPPED | OUTPATIENT
Start: 2021-08-29 | End: 2021-09-01

## 2021-08-29 RX ADMIN — Medication 10 ML: at 00:52

## 2021-08-29 RX ADMIN — HYDROMORPHONE HYDROCHLORIDE 1 MG: 1 INJECTION, SOLUTION INTRAMUSCULAR; INTRAVENOUS; SUBCUTANEOUS at 00:51

## 2021-08-29 RX ADMIN — HYDROMORPHONE HYDROCHLORIDE 1 MG: 1 INJECTION, SOLUTION INTRAMUSCULAR; INTRAVENOUS; SUBCUTANEOUS at 01:19

## 2021-08-29 NOTE — ED NOTES
Patient ambulatory to restroom to attempt UA sample.  Patient states his pain is better around 1-2:10

## 2021-08-29 NOTE — ED NOTES
MD at bedside talking with patient. Patient reports 5:10 pain remains. Vitals obtained WNL. IVF downed.

## 2021-08-29 NOTE — ED NOTES
Pain assessment on discharge was 1  Condition Stable  Patient discharged to home  Patient education was completed  Education taught to patient and spouse  Teaching method used was handout and verbal  Understanding of teaching was good  Patient was discharged ambulatory   Discharged with family   Valuables were given to: patient

## 2021-08-29 NOTE — ED PROVIDER NOTES
History of a kidney stone. He presents accompanied by his wife with complaints of right lower quadrant and right flank pain. It began approximately 2 hours ago. It has been intense at times. No exacerbating or relieving factors. He has had nausea but no vomiting. He has had some urinary hesitancy and noted blood in his urine prior to arrival.  The pain feels similar to how it felt when he had a previous kidney stone. Past Medical History:   Diagnosis Date    Ill-defined condition     CYST ON TEETH        Past Surgical History:   Procedure Laterality Date    HX APPENDECTOMY  1982    HX HEENT  1987    WISDOM TEETH EXTRACTION     HX TONSILLECTOMY  1977         Family History:   Problem Relation Age of Onset    Cancer Mother         BREAST     Diabetes Father     Heart Disease Father     Anesth Problems Neg Hx        Social History     Socioeconomic History    Marital status:      Spouse name: Not on file    Number of children: Not on file    Years of education: Not on file    Highest education level: Not on file   Occupational History    Not on file   Tobacco Use    Smoking status: Never Smoker    Smokeless tobacco: Never Used   Substance and Sexual Activity    Alcohol use: Not Currently    Drug use: Never    Sexual activity: Not on file   Other Topics Concern    Not on file   Social History Narrative    Not on file     Social Determinants of Health     Financial Resource Strain:     Difficulty of Paying Living Expenses:    Food Insecurity:     Worried About Running Out of Food in the Last Year:     Ran Out of Food in the Last Year:    Transportation Needs:     Lack of Transportation (Medical):      Lack of Transportation (Non-Medical):    Physical Activity:     Days of Exercise per Week:     Minutes of Exercise per Session:    Stress:     Feeling of Stress :    Social Connections:     Frequency of Communication with Friends and Family:     Frequency of Social Gatherings with Friends and Family:     Attends Shinto Services:     Active Member of Clubs or Organizations:     Attends Club or Organization Meetings:     Marital Status:    Intimate Partner Violence:     Fear of Current or Ex-Partner:     Emotionally Abused:     Physically Abused:     Sexually Abused: ALLERGIES: Patient has no known allergies. Review of Systems   All other systems reviewed and are negative. Vitals:    08/28/21 2343   BP: (!) 135/95   Pulse: 67   Resp: 16   Temp: 97.9 °F (36.6 °C)   SpO2: 98%   Weight: 93.5 kg (206 lb 2.1 oz)   Height: 6' 1\" (1.854 m)            Physical Exam  Vitals and nursing note reviewed. Constitutional:       Appearance: He is well-developed. Comments: Appears uncomfortable. HENT:      Head: Normocephalic and atraumatic. Eyes:      Conjunctiva/sclera: Conjunctivae normal.   Neck:      Trachea: No tracheal deviation. Cardiovascular:      Rate and Rhythm: Normal rate and regular rhythm. Heart sounds: Normal heart sounds. No murmur heard. No friction rub. No gallop. Pulmonary:      Effort: Pulmonary effort is normal.      Breath sounds: Normal breath sounds. Abdominal:      Palpations: Abdomen is soft. Tenderness: There is no abdominal tenderness. Musculoskeletal:         General: No deformity. Cervical back: Neck supple. Skin:     General: Skin is warm and dry. Neurological:      Mental Status: He is alert. Comments: oriented          MDM       Procedures    Progress Note:  Results, treatment, and follow up plan have been discussed with patient/wife. Questions were answered. Nelia Smalls MD  1:47 AM    Assessment/plan: Right ureteral stone (3 mm) -pain relief in the ED with Toradol and Dilaudid. Reassuring appearance/exam with stable vital signs. CBC, BMP, UA okay. CT shows a 3 mm right distal ureteral stone. Home with recommendations of ibuprofen.   Prescriptions for Percocet, Zofran, and Flomax provided. Urology follow-up as needed. Return precautions discussed.   Deanne Weir MD  1:48 AM

## 2021-08-29 NOTE — ED TRIAGE NOTES
Patient complaining for right sided flank pain radiating down into his groin. +blood in urine. Onset around 10pm this evening. Patient reports having a kidney stone about 25 years ago.

## 2021-09-01 LAB
ANION GAP SERPL CALC-SCNC: 6 MMOL/L (ref 5–15)
BASOPHILS # BLD: 0.2 K/UL (ref 0–0.1)
BASOPHILS NFR BLD: 2 % (ref 0–1)
BUN SERPL-MCNC: 19 MG/DL (ref 6–20)
BUN/CREAT SERPL: 15 (ref 12–20)
CALCIUM SERPL-MCNC: 9.5 MG/DL (ref 8.5–10.1)
CHLORIDE SERPL-SCNC: 103 MMOL/L (ref 97–108)
CO2 SERPL-SCNC: 33 MMOL/L (ref 21–32)
CREAT SERPL-MCNC: 1.31 MG/DL (ref 0.7–1.3)
DIFFERENTIAL METHOD BLD: ABNORMAL
EOSINOPHIL # BLD: 0.2 K/UL (ref 0–0.4)
EOSINOPHIL NFR BLD: 2 % (ref 0–7)
ERYTHROCYTE [DISTWIDTH] IN BLOOD BY AUTOMATED COUNT: 12.6 % (ref 11.5–14.5)
GLUCOSE SERPL-MCNC: 120 MG/DL (ref 65–100)
HCT VFR BLD AUTO: 41.7 % (ref 36.6–50.3)
HGB BLD-MCNC: 14.1 G/DL (ref 12.1–17)
IMM GRANULOCYTES # BLD AUTO: 0 K/UL
IMM GRANULOCYTES NFR BLD AUTO: 0 %
LYMPHOCYTES # BLD: 5.3 K/UL (ref 0.8–3.5)
LYMPHOCYTES NFR BLD: 43 % (ref 12–49)
MCH RBC QN AUTO: 32 PG (ref 26–34)
MCHC RBC AUTO-ENTMCNC: 33.8 G/DL (ref 30–36.5)
MCV RBC AUTO: 94.8 FL (ref 80–99)
MONOCYTES # BLD: 0.7 K/UL (ref 0–1)
MONOCYTES NFR BLD: 6 % (ref 5–13)
NEUTS BAND NFR BLD MANUAL: 1 % (ref 0–6)
NEUTS SEG # BLD: 6 K/UL (ref 1.8–8)
NEUTS SEG NFR BLD: 46 % (ref 32–75)
NRBC # BLD: 0 K/UL (ref 0–0.01)
NRBC BLD-RTO: 0 PER 100 WBC
PLATELET # BLD AUTO: 262 K/UL (ref 150–400)
PMV BLD AUTO: 9.7 FL (ref 8.9–12.9)
POTASSIUM SERPL-SCNC: 4 MMOL/L (ref 3.5–5.1)
RBC # BLD AUTO: 4.4 M/UL (ref 4.1–5.7)
RBC MORPH BLD: ABNORMAL
SODIUM SERPL-SCNC: 142 MMOL/L (ref 136–145)
WBC # BLD AUTO: 12.4 K/UL (ref 4.1–11.1)
WBC MORPH BLD: ABNORMAL

## 2021-10-03 ENCOUNTER — HOSPITAL ENCOUNTER (EMERGENCY)
Age: 51
Discharge: HOME | End: 2021-10-03
Attending: EMERGENCY MEDICINE
Payer: COMMERCIAL

## 2021-10-03 ENCOUNTER — APPOINTMENT (OUTPATIENT)
Dept: CT IMAGING | Age: 51
End: 2021-10-03
Attending: EMERGENCY MEDICINE
Payer: COMMERCIAL

## 2021-10-03 VITALS
RESPIRATION RATE: 14 BRPM | SYSTOLIC BLOOD PRESSURE: 145 MMHG | OXYGEN SATURATION: 96 % | BODY MASS INDEX: 27.14 KG/M2 | HEIGHT: 73 IN | HEART RATE: 80 BPM | TEMPERATURE: 98.1 F | WEIGHT: 204.81 LBS | DIASTOLIC BLOOD PRESSURE: 96 MMHG

## 2021-10-03 DIAGNOSIS — N20.0 KIDNEY STONE: Primary | ICD-10-CM

## 2021-10-03 LAB
ALBUMIN SERPL-MCNC: 4.6 G/DL (ref 3.5–5)
ALBUMIN/GLOB SERPL: 1.4 {RATIO} (ref 1.1–2.2)
ALP SERPL-CCNC: 66 U/L (ref 45–117)
ALT SERPL-CCNC: 42 U/L (ref 12–78)
ANION GAP SERPL CALC-SCNC: 9 MMOL/L (ref 5–15)
APPEARANCE UR: CLEAR
AST SERPL-CCNC: 22 U/L (ref 15–37)
BACTERIA URNS QL MICRO: NEGATIVE /HPF
BASOPHILS # BLD: 0 K/UL (ref 0–0.1)
BASOPHILS NFR BLD: 0 % (ref 0–1)
BILIRUB SERPL-MCNC: 0.7 MG/DL (ref 0.2–1)
BILIRUB UR QL: NEGATIVE
BUN SERPL-MCNC: 15 MG/DL (ref 6–20)
BUN/CREAT SERPL: 11 (ref 12–20)
CALCIUM SERPL-MCNC: 9.5 MG/DL (ref 8.5–10.1)
CHLORIDE SERPL-SCNC: 102 MMOL/L (ref 97–108)
CO2 SERPL-SCNC: 29 MMOL/L (ref 21–32)
COLOR UR: ABNORMAL
CREAT SERPL-MCNC: 1.38 MG/DL (ref 0.7–1.3)
DIFFERENTIAL METHOD BLD: ABNORMAL
EOSINOPHIL # BLD: 0 K/UL (ref 0–0.4)
EOSINOPHIL NFR BLD: 0 % (ref 0–7)
EPITH CASTS URNS QL MICRO: ABNORMAL /LPF
ERYTHROCYTE [DISTWIDTH] IN BLOOD BY AUTOMATED COUNT: 12.4 % (ref 11.5–14.5)
GLOBULIN SER CALC-MCNC: 3.4 G/DL (ref 2–4)
GLUCOSE SERPL-MCNC: 161 MG/DL (ref 65–100)
GLUCOSE UR STRIP.AUTO-MCNC: 100 MG/DL
HCT VFR BLD AUTO: 43 % (ref 36.6–50.3)
HGB BLD-MCNC: 14.5 G/DL (ref 12.1–17)
HGB UR QL STRIP: ABNORMAL
IMM GRANULOCYTES # BLD AUTO: 0.1 K/UL (ref 0–0.04)
IMM GRANULOCYTES NFR BLD AUTO: 0 % (ref 0–0.5)
KETONES UR QL STRIP.AUTO: NEGATIVE MG/DL
LEUKOCYTE ESTERASE UR QL STRIP.AUTO: NEGATIVE
LYMPHOCYTES # BLD: 1.2 K/UL (ref 0.8–3.5)
LYMPHOCYTES NFR BLD: 8 % (ref 12–49)
MCH RBC QN AUTO: 31.8 PG (ref 26–34)
MCHC RBC AUTO-ENTMCNC: 33.7 G/DL (ref 30–36.5)
MCV RBC AUTO: 94.3 FL (ref 80–99)
MONOCYTES # BLD: 0.8 K/UL (ref 0–1)
MONOCYTES NFR BLD: 5 % (ref 5–13)
MUCOUS THREADS URNS QL MICRO: ABNORMAL /LPF
NEUTS SEG # BLD: 12.2 K/UL (ref 1.8–8)
NEUTS SEG NFR BLD: 87 % (ref 32–75)
NITRITE UR QL STRIP.AUTO: NEGATIVE
NRBC # BLD: 0 K/UL (ref 0–0.01)
NRBC BLD-RTO: 0 PER 100 WBC
PH UR STRIP: 6 [PH] (ref 5–8)
PLATELET # BLD AUTO: 220 K/UL (ref 150–400)
PMV BLD AUTO: 9.5 FL (ref 8.9–12.9)
POTASSIUM SERPL-SCNC: 4 MMOL/L (ref 3.5–5.1)
PROT SERPL-MCNC: 8 G/DL (ref 6.4–8.2)
PROT UR STRIP-MCNC: NEGATIVE MG/DL
RBC # BLD AUTO: 4.56 M/UL (ref 4.1–5.7)
RBC #/AREA URNS HPF: ABNORMAL /HPF (ref 0–5)
SODIUM SERPL-SCNC: 140 MMOL/L (ref 136–145)
SP GR UR REFRACTOMETRY: 1.02 (ref 1–1.03)
UR CULT HOLD, URHOLD: NORMAL
UROBILINOGEN UR QL STRIP.AUTO: 0.2 EU/DL (ref 0.2–1)
WBC # BLD AUTO: 14.3 K/UL (ref 4.1–11.1)
WBC URNS QL MICRO: ABNORMAL /HPF (ref 0–4)

## 2021-10-03 PROCEDURE — 74011250636 HC RX REV CODE- 250/636: Performed by: EMERGENCY MEDICINE

## 2021-10-03 PROCEDURE — 74176 CT ABD & PELVIS W/O CONTRAST: CPT

## 2021-10-03 PROCEDURE — 81001 URINALYSIS AUTO W/SCOPE: CPT

## 2021-10-03 PROCEDURE — 96374 THER/PROPH/DIAG INJ IV PUSH: CPT

## 2021-10-03 PROCEDURE — 99283 EMERGENCY DEPT VISIT LOW MDM: CPT

## 2021-10-03 PROCEDURE — 36415 COLL VENOUS BLD VENIPUNCTURE: CPT

## 2021-10-03 PROCEDURE — 80053 COMPREHEN METABOLIC PANEL: CPT

## 2021-10-03 PROCEDURE — 85025 COMPLETE CBC W/AUTO DIFF WBC: CPT

## 2021-10-03 RX ORDER — HYDROMORPHONE HYDROCHLORIDE 1 MG/ML
1 INJECTION, SOLUTION INTRAMUSCULAR; INTRAVENOUS; SUBCUTANEOUS
Status: COMPLETED | OUTPATIENT
Start: 2021-10-03 | End: 2021-10-03

## 2021-10-03 RX ORDER — ONDANSETRON 4 MG/1
4 TABLET, ORALLY DISINTEGRATING ORAL
Qty: 9 TABLET | Refills: 0 | Status: SHIPPED | OUTPATIENT
Start: 2021-10-03

## 2021-10-03 RX ORDER — TAMSULOSIN HYDROCHLORIDE 0.4 MG/1
0.4 CAPSULE ORAL DAILY
COMMUNITY

## 2021-10-03 RX ORDER — OXYCODONE AND ACETAMINOPHEN 5; 325 MG/1; MG/1
1 TABLET ORAL
COMMUNITY

## 2021-10-03 RX ORDER — KETOROLAC TROMETHAMINE 10 MG/1
10 TABLET, FILM COATED ORAL
Qty: 12 TABLET | Refills: 0 | Status: SHIPPED | OUTPATIENT
Start: 2021-10-03 | End: 2021-10-06

## 2021-10-03 RX ADMIN — SODIUM CHLORIDE 1000 ML: 9 INJECTION, SOLUTION INTRAVENOUS at 18:42

## 2021-10-03 RX ADMIN — HYDROMORPHONE HYDROCHLORIDE 1 MG: 1 INJECTION, SOLUTION INTRAMUSCULAR; INTRAVENOUS; SUBCUTANEOUS at 18:42

## 2021-10-03 NOTE — ED NOTES
Discharge instructions reviewed with pt and copy given along with RX by this RN. Patient educated on follow up with PCP and Urology and patient is ambulatory from ED in no sign of distress or discomfort.

## 2021-10-03 NOTE — ED TRIAGE NOTES
Pt reports history of kidney stones a month ago and started with similar pain this am to R flank area. Pt reports vomiting once around noon.

## 2021-10-03 NOTE — ED PROVIDER NOTES
Mr. Katie Davidson is a 54yo male who presents to the ER with complaints of right flank and right lower abdomen pain. He said that he has a history of kidney stones. He had a kidney since 25 years ago and then approxi-1 month ago. His pain returned at approximately 9 AM.  He had not seen the urologist after his previous kidney stone. He has been taking Aleve, oxycodone and Zofran today without much relief. He denies any changes with his bowel movements. Initially this morning, he had difficulty initiating a flow. However, he has been able to urinate regularly throughout the day. He denies any blood in his urine or any other change in color of his urine. He has had some nausea but no vomiting. He denies any other complaints.            Past Medical History:   Diagnosis Date    Ill-defined condition     CYST ON TEETH        Past Surgical History:   Procedure Laterality Date    HX APPENDECTOMY  1982    HX HEENT  1987    WISDOM TEETH EXTRACTION     HX TONSILLECTOMY  1977         Family History:   Problem Relation Age of Onset    Cancer Mother         BREAST     Diabetes Father     Heart Disease Father     Anesth Problems Neg Hx        Social History     Socioeconomic History    Marital status:      Spouse name: Not on file    Number of children: Not on file    Years of education: Not on file    Highest education level: Not on file   Occupational History    Not on file   Tobacco Use    Smoking status: Never Smoker    Smokeless tobacco: Never Used   Substance and Sexual Activity    Alcohol use: Not Currently    Drug use: Never    Sexual activity: Not on file   Other Topics Concern    Not on file   Social History Narrative    Not on file     Social Determinants of Health     Financial Resource Strain:     Difficulty of Paying Living Expenses:    Food Insecurity:     Worried About Running Out of Food in the Last Year:     [address] in the Last Year:    Transportation Needs:     Lack of Transportation (Medical):  Lack of Transportation (Non-Medical):    Physical Activity:     Days of Exercise per Week:     Minutes of Exercise per Session:    Stress:     Feeling of Stress :    Social Connections:     Frequency of Communication with Friends and Family:     Frequency of Social Gatherings with Friends and Family:     Attends Sikhism Services:     Active Member of Clubs or Organizations:     Attends Club or Organization Meetings:     Marital Status:    Intimate Partner Violence:     Fear of Current or Ex-Partner:     Emotionally Abused:     Physically Abused:     Sexually Abused: ALLERGIES: Patient has no known allergies. Review of Systems   Constitutional: Negative for chills and fever. HENT: Negative for rhinorrhea and sore throat. Respiratory: Negative for cough and shortness of breath. Cardiovascular: Negative for chest pain. Gastrointestinal: Positive for abdominal pain and nausea. Negative for diarrhea and vomiting. Genitourinary: Positive for flank pain. Negative for dysuria and hematuria. Musculoskeletal: Negative for arthralgias and myalgias. Skin: Negative for pallor and rash. Neurological: Negative for dizziness, weakness and light-headedness. All other systems reviewed and are negative. Vitals:    10/03/21 1748   BP: (!) 145/96   Pulse: 80   Resp: 14   Temp: 98.1 °F (36.7 °C)   SpO2: 96%   Weight: 92.9 kg (204 lb 12.9 oz)   Height: 6' 1\" (1.854 m)            Physical Exam     Vital signs reviewed. Nursing notes reviewed.     Const:  No acute distress, well developed, well nourished  Head:  Atraumatic, normocephalic  Eyes:  PERRL, conjunctiva normal, no scleral icterus  Neck:  Supple, trachea midline  Cardiovascular:  Regular rate  Resp:  No resp distress, no increased work of breathing  Abd:  Soft, non-tender, non-distended, no rebound, no guarding, no CVA tenderness  MSK:  No pedal edema, normal ROM  Neuro:  Alert and oriented x3, no cranial nerve defect  Skin:  Warm, dry, intact  Psych: normal mood and affect, behavior is normal, judgement and thought content is normal          MDM  Number of Diagnoses or Management Options     Amount and/or Complexity of Data Reviewed  Clinical lab tests: ordered and reviewed  Tests in the radiology section of CPT®: ordered and reviewed  Review and summarize past medical records: yes    Patient Progress  Patient progress: stable         Procedures        7:00 PM  Change of shift. Care of patient taken over from Dr. Eileen Byrd; H&P reviewed, handoff complete. Awaiting labs/imaging/consultant.

## 2023-12-15 ENCOUNTER — OFFICE VISIT (OUTPATIENT)
Age: 53
End: 2023-12-15

## 2023-12-15 VITALS
DIASTOLIC BLOOD PRESSURE: 100 MMHG | TEMPERATURE: 99.1 F | WEIGHT: 222.8 LBS | OXYGEN SATURATION: 95 % | HEART RATE: 103 BPM | RESPIRATION RATE: 18 BRPM | BODY MASS INDEX: 29.39 KG/M2 | SYSTOLIC BLOOD PRESSURE: 169 MMHG

## 2023-12-15 DIAGNOSIS — J10.1 INFLUENZA A: ICD-10-CM

## 2023-12-15 DIAGNOSIS — R10.9 FLANK PAIN: Primary | ICD-10-CM

## 2023-12-15 DIAGNOSIS — R05.9 COUGH, UNSPECIFIED TYPE: ICD-10-CM

## 2023-12-15 LAB
BILIRUBIN, URINE, POC: NEGATIVE
BLOOD URINE, POC: NEGATIVE
GLUCOSE URINE, POC: NEGATIVE
INFLUENZA A ANTIGEN, POC: POSITIVE
INFLUENZA B ANTIGEN, POC: NEGATIVE
KETONES, URINE, POC: NEGATIVE
LEUKOCYTE ESTERASE, URINE, POC: NEGATIVE
Lab: NORMAL
NITRITE, URINE, POC: NEGATIVE
PH, URINE, POC: 6.5 (ref 4.6–8)
PROTEIN,URINE, POC: 30
QC PASS/FAIL: NORMAL
SARS-COV-2 RDRP RESP QL NAA+PROBE: NEGATIVE
SPECIFIC GRAVITY, URINE, POC: 1.02 (ref 1–1.03)
URINALYSIS CLARITY, POC: CLEAR
URINALYSIS COLOR, POC: YELLOW
UROBILINOGEN, POC: NORMAL
VALID INTERNAL CONTROL, POC: ABNORMAL

## 2023-12-15 RX ORDER — VILAZODONE HYDROCHLORIDE 20 MG/1
20 TABLET ORAL DAILY
COMMUNITY

## 2023-12-15 RX ORDER — ACETAMINOPHEN 500 MG
500 TABLET ORAL EVERY 6 HOURS PRN
COMMUNITY

## 2023-12-15 RX ORDER — BENZONATATE 100 MG/1
100 CAPSULE ORAL 2 TIMES DAILY PRN
Qty: 10 CAPSULE | Refills: 0 | Status: SHIPPED | OUTPATIENT
Start: 2023-12-15 | End: 2023-12-20

## 2023-12-15 RX ORDER — OSELTAMIVIR PHOSPHATE 75 MG/1
75 CAPSULE ORAL 2 TIMES DAILY
Qty: 10 CAPSULE | Refills: 0 | Status: SHIPPED | OUTPATIENT
Start: 2023-12-15 | End: 2023-12-20

## 2023-12-15 NOTE — PROGRESS NOTES
Subjective:       History was provided by the patient. Shahida Palmer is a 48 y.o. male who presents for evaluation of symptoms of a URI. Symptoms include low grade fevers, chills, dry cough, myalgias, sore throat, and swollen glands. Onset of symptoms was 1 day ago, gradually worsening since that time. Associated symptoms include right ear pressure/pain, achiness, congestion, low grade fever, post nasal drip, sneezing, and sore throat. He is drinking plenty of fluids. Evaluation to date: none. Treatment to date: Acetaminophen, Mucinex  Patient's medications, allergies, past medical, surgical, social and family histories were reviewed and updated as appropriate. Review of Systems  Pertinent items are noted in HPI. Objective:      General appearance: alert, appears stated age, and cooperative  Ears: normal TM's and external ear canals both ears  Nose: no discharge, turbinates normal, no sinus tenderness  Throat: normal findings: pink and moist  Lungs: clear to auscultation bilaterally  Heart: S1, S2 normal, tachycardic  Lymph nodes: Cervical adenopathy: tenderness no swelling         Assessment:      Influenza A positive. BP elevated and tacycardic, febrile, SPO2 95% on room air. Cough present. Advised to follow-up with PCP for urine recheck.   -AMB POC INFLUENZA A AND B REAL-TIME RT-PCR  -AMB POC URINALYSIS DIP STICK AUTO W/MICRO  -POCT COVID-19 Rapid, NAAT     Results for orders placed or performed in visit on 12/15/23   AMB POC INFLUENZA A  AND B REAL-TIME RT-PCR   Result Value Ref Range    Valid Internal Control, POC PASS     Influenza A Antigen, POC Positive     Influenza B Antigen, POC Negative    POCT COVID-19 Rapid, NAAT   Result Value Ref Range    SARS-COV-2, RdRp gene Negative Negative    Lot Number [id number]     QC Pass/Fail PASS    AMB POC URINALYSIS DIP STICK AUTO W/ MICRO   Result Value Ref Range    Color (UA POC) Yellow     Clarity (UA POC) Clear     Glucose, Urine, POC Negative     Bilirubin,

## 2023-12-15 NOTE — PATIENT INSTRUCTIONS
If symptoms worsens or fail to improve follow-up with PCP or ER. Drink plenty of fluids and take medications as prescribed. Over-the-counter Tylenol for fever and body aches.   Follow-up with PCP for urine re-evaluation

## (undated) DEVICE — PAD,EYE,1-5/8X2 5/8,STERILE,LF,1/PK: Brand: MEDLINE

## (undated) DEVICE — SYR 10ML LUER LOK 1/5ML GRAD --

## (undated) DEVICE — 2.1MM CROSS CUT FISSURE

## (undated) DEVICE — INTENDED FOR TISSUE SEPARATION, AND OTHER PROCEDURES THAT REQUIRE A SHARP SURGICAL BLADE TO PUNCTURE OR CUT.: Brand: BARD-PARKER ® CARBON RIB-BACK BLADES

## (undated) DEVICE — TUBING SUCT 10FR MAL ALUM SHFT FN CAP VENT UNIV CONN W/ OBT

## (undated) DEVICE — SOLUTION IV 1000ML 0.9% SOD CHL

## (undated) DEVICE — SYR LR LCK 1ML GRAD NSAF 30ML --

## (undated) DEVICE — PACK,EENT,TURBAN DRAPE,PK II: Brand: MEDLINE

## (undated) DEVICE — ROCKER SWITCH PENCIL BLADE ELECTRODE, HOLSTER: Brand: EDGE

## (undated) DEVICE — STRAP,POSITIONING,KNEE/BODY,FOAM,4X60": Brand: MEDLINE

## (undated) DEVICE — SOLUTION IRRIG 1000ML H2O STRL BLT

## (undated) DEVICE — STERILE POLYISOPRENE POWDER-FREE SURGICAL GLOVES WITH EMOLLIENT COATING: Brand: PROTEXIS

## (undated) DEVICE — INFECTION CONTROL KIT SYS

## (undated) DEVICE — DRAPE CAM W7XL96IN W/ BLU KRATON TIP FOR LSR VID

## (undated) DEVICE — TOWEL SURG W17XL27IN STD BLU COT NONFENESTRATED PREWASHED

## (undated) DEVICE — SUTURE CHROMIC GUT SZ 4-0 L18IN ABSRB BRN L13MM P-3 3/8 CIR 1654G

## (undated) DEVICE — NEEDLE HYPO 25GA L1.5IN BLU POLYPR HUB S STL REG BVL STR

## (undated) DEVICE — PACKING 8004008 NEURAY 200PK 25X76MM: Brand: NEURAY ®

## (undated) DEVICE — SURGICAL PROCEDURE PACK BASIN MAJ SET CUST NO CAUT

## (undated) DEVICE — ELECTRODE NDL 2.8IN COAT VALLEYLAB

## (undated) DEVICE — TRAY PREP DRY W/ PREM GLV 2 APPL 6 SPNG 2 UNDPD 1 OVERWRAP